# Patient Record
Sex: FEMALE | Race: WHITE | Employment: OTHER | URBAN - METROPOLITAN AREA
[De-identification: names, ages, dates, MRNs, and addresses within clinical notes are randomized per-mention and may not be internally consistent; named-entity substitution may affect disease eponyms.]

---

## 2022-06-20 ENCOUNTER — HOSPITAL ENCOUNTER (INPATIENT)
Age: 72
LOS: 2 days | Discharge: HOME | DRG: 202 | End: 2022-06-22
Attending: EMERGENCY MEDICINE | Admitting: INTERNAL MEDICINE
Payer: MEDICARE

## 2022-06-20 ENCOUNTER — APPOINTMENT (OUTPATIENT)
Dept: GENERAL RADIOLOGY | Age: 72
DRG: 202 | End: 2022-06-20
Attending: EMERGENCY MEDICINE
Payer: MEDICARE

## 2022-06-20 DIAGNOSIS — J44.1 ACUTE EXACERBATION OF CHRONIC OBSTRUCTIVE PULMONARY DISEASE (COPD) (HCC): Primary | ICD-10-CM

## 2022-06-20 DIAGNOSIS — R09.02 HYPOXIA: ICD-10-CM

## 2022-06-20 PROBLEM — E87.1 HYPONATREMIA: Status: ACTIVE | Noted: 2022-06-20

## 2022-06-20 PROBLEM — E86.0 DEHYDRATION: Status: ACTIVE | Noted: 2022-06-20

## 2022-06-20 PROBLEM — R63.6 UNDERWEIGHT: Status: ACTIVE | Noted: 2022-06-20

## 2022-06-20 PROBLEM — J96.01 ACUTE HYPOXEMIC RESPIRATORY FAILURE (HCC): Status: ACTIVE | Noted: 2022-06-20

## 2022-06-20 LAB
ALBUMIN SERPL-MCNC: 3.5 G/DL (ref 3.5–5)
ALBUMIN/GLOB SERPL: 0.9 {RATIO} (ref 1.1–2.2)
ALP SERPL-CCNC: 69 U/L (ref 45–117)
ALT SERPL-CCNC: 33 U/L (ref 12–78)
AMORPH CRY URNS QL MICRO: ABNORMAL
ANION GAP SERPL CALC-SCNC: 3 MMOL/L (ref 5–15)
APPEARANCE UR: CLEAR
AST SERPL-CCNC: 24 U/L (ref 15–37)
B PERT DNA SPEC QL NAA+PROBE: NOT DETECTED
BACTERIA URNS QL MICRO: NEGATIVE /HPF
BASOPHILS # BLD: 0 K/UL (ref 0–0.1)
BASOPHILS NFR BLD: 0 % (ref 0–1)
BILIRUB SERPL-MCNC: 0.4 MG/DL (ref 0.2–1)
BILIRUB UR QL: NEGATIVE
BNP SERPL-MCNC: 352 PG/ML (ref 0–125)
BORDETELLA PARAPERTUSSIS PCR, BORPAR: NOT DETECTED
BUN SERPL-MCNC: 11 MG/DL (ref 6–20)
BUN/CREAT SERPL: 18 (ref 12–20)
C PNEUM DNA SPEC QL NAA+PROBE: NOT DETECTED
CALCIUM SERPL-MCNC: 9.6 MG/DL (ref 8.5–10.1)
CHLORIDE SERPL-SCNC: 95 MMOL/L (ref 97–108)
CO2 SERPL-SCNC: 35 MMOL/L (ref 21–32)
COLOR UR: ABNORMAL
CREAT SERPL-MCNC: 0.61 MG/DL (ref 0.55–1.02)
DIFFERENTIAL METHOD BLD: ABNORMAL
EOSINOPHIL # BLD: 0 K/UL (ref 0–0.4)
EOSINOPHIL NFR BLD: 0 % (ref 0–7)
EPITH CASTS URNS QL MICRO: ABNORMAL /LPF
ERYTHROCYTE [DISTWIDTH] IN BLOOD BY AUTOMATED COUNT: 12.7 % (ref 11.5–14.5)
FLUAV H1 2009 PAND RNA SPEC QL NAA+PROBE: NOT DETECTED
FLUAV H1 RNA SPEC QL NAA+PROBE: NOT DETECTED
FLUAV H3 RNA SPEC QL NAA+PROBE: NOT DETECTED
FLUAV SUBTYP SPEC NAA+PROBE: NOT DETECTED
FLUBV RNA SPEC QL NAA+PROBE: NOT DETECTED
GLOBULIN SER CALC-MCNC: 4 G/DL (ref 2–4)
GLUCOSE SERPL-MCNC: 115 MG/DL (ref 65–100)
GLUCOSE UR STRIP.AUTO-MCNC: NEGATIVE MG/DL
HADV DNA SPEC QL NAA+PROBE: NOT DETECTED
HCOV 229E RNA SPEC QL NAA+PROBE: NOT DETECTED
HCOV HKU1 RNA SPEC QL NAA+PROBE: NOT DETECTED
HCOV NL63 RNA SPEC QL NAA+PROBE: NOT DETECTED
HCOV OC43 RNA SPEC QL NAA+PROBE: NOT DETECTED
HCT VFR BLD AUTO: 50.8 % (ref 35–47)
HGB BLD-MCNC: 16.1 G/DL (ref 11.5–16)
HGB UR QL STRIP: ABNORMAL
HMPV RNA SPEC QL NAA+PROBE: NOT DETECTED
HPIV1 RNA SPEC QL NAA+PROBE: NOT DETECTED
HPIV2 RNA SPEC QL NAA+PROBE: NOT DETECTED
HPIV3 RNA SPEC QL NAA+PROBE: DETECTED
HPIV4 RNA SPEC QL NAA+PROBE: NOT DETECTED
IMM GRANULOCYTES # BLD AUTO: 0 K/UL (ref 0–0.04)
IMM GRANULOCYTES NFR BLD AUTO: 0 % (ref 0–0.5)
KETONES UR QL STRIP.AUTO: NEGATIVE MG/DL
LEUKOCYTE ESTERASE UR QL STRIP.AUTO: NEGATIVE
LYMPHOCYTES # BLD: 2.1 K/UL (ref 0.8–3.5)
LYMPHOCYTES NFR BLD: 23 % (ref 12–49)
M PNEUMO DNA SPEC QL NAA+PROBE: NOT DETECTED
MCH RBC QN AUTO: 30.7 PG (ref 26–34)
MCHC RBC AUTO-ENTMCNC: 31.7 G/DL (ref 30–36.5)
MCV RBC AUTO: 96.9 FL (ref 80–99)
MONOCYTES # BLD: 0.8 K/UL (ref 0–1)
MONOCYTES NFR BLD: 9 % (ref 5–13)
NEUTS SEG # BLD: 6 K/UL (ref 1.8–8)
NEUTS SEG NFR BLD: 68 % (ref 32–75)
NITRITE UR QL STRIP.AUTO: NEGATIVE
NRBC # BLD: 0 K/UL (ref 0–0.01)
NRBC BLD-RTO: 0 PER 100 WBC
PH UR STRIP: 6.5 [PH] (ref 5–8)
PLATELET # BLD AUTO: 217 K/UL (ref 150–400)
PMV BLD AUTO: 10.2 FL (ref 8.9–12.9)
POTASSIUM SERPL-SCNC: 4.3 MMOL/L (ref 3.5–5.1)
PROCALCITONIN SERPL-MCNC: <0.05 NG/ML
PROT SERPL-MCNC: 7.5 G/DL (ref 6.4–8.2)
PROT UR STRIP-MCNC: NEGATIVE MG/DL
RBC # BLD AUTO: 5.24 M/UL (ref 3.8–5.2)
RBC #/AREA URNS HPF: ABNORMAL /HPF (ref 0–5)
RSV RNA SPEC QL NAA+PROBE: NOT DETECTED
RV+EV RNA SPEC QL NAA+PROBE: NOT DETECTED
SARS-COV-2 PCR, COVPCR: NOT DETECTED
SODIUM SERPL-SCNC: 133 MMOL/L (ref 136–145)
SP GR UR REFRACTOMETRY: 1.01 (ref 1–1.03)
TROPONIN-HIGH SENSITIVITY: 10 NG/L (ref 0–51)
UROBILINOGEN UR QL STRIP.AUTO: 0.2 EU/DL (ref 0.2–1)
WBC # BLD AUTO: 8.9 K/UL (ref 3.6–11)
WBC URNS QL MICRO: ABNORMAL /HPF (ref 0–4)

## 2022-06-20 PROCEDURE — 87086 URINE CULTURE/COLONY COUNT: CPT

## 2022-06-20 PROCEDURE — 84145 PROCALCITONIN (PCT): CPT

## 2022-06-20 PROCEDURE — 36415 COLL VENOUS BLD VENIPUNCTURE: CPT

## 2022-06-20 PROCEDURE — 93005 ELECTROCARDIOGRAM TRACING: CPT

## 2022-06-20 PROCEDURE — 85025 COMPLETE CBC W/AUTO DIFF WBC: CPT

## 2022-06-20 PROCEDURE — 74011250636 HC RX REV CODE- 250/636: Performed by: EMERGENCY MEDICINE

## 2022-06-20 PROCEDURE — 74011250636 HC RX REV CODE- 250/636: Performed by: INTERNAL MEDICINE

## 2022-06-20 PROCEDURE — 94640 AIRWAY INHALATION TREATMENT: CPT

## 2022-06-20 PROCEDURE — 84484 ASSAY OF TROPONIN QUANT: CPT

## 2022-06-20 PROCEDURE — 0202U NFCT DS 22 TRGT SARS-COV-2: CPT

## 2022-06-20 PROCEDURE — 83880 ASSAY OF NATRIURETIC PEPTIDE: CPT

## 2022-06-20 PROCEDURE — 74011000250 HC RX REV CODE- 250: Performed by: INTERNAL MEDICINE

## 2022-06-20 PROCEDURE — 74011250637 HC RX REV CODE- 250/637: Performed by: EMERGENCY MEDICINE

## 2022-06-20 PROCEDURE — 74011000250 HC RX REV CODE- 250: Performed by: EMERGENCY MEDICINE

## 2022-06-20 PROCEDURE — 99285 EMERGENCY DEPT VISIT HI MDM: CPT

## 2022-06-20 PROCEDURE — 80053 COMPREHEN METABOLIC PANEL: CPT

## 2022-06-20 PROCEDURE — 96374 THER/PROPH/DIAG INJ IV PUSH: CPT

## 2022-06-20 PROCEDURE — 65270000029 HC RM PRIVATE

## 2022-06-20 PROCEDURE — 71045 X-RAY EXAM CHEST 1 VIEW: CPT

## 2022-06-20 PROCEDURE — 81001 URINALYSIS AUTO W/SCOPE: CPT

## 2022-06-20 RX ORDER — ONDANSETRON 2 MG/ML
4 INJECTION INTRAMUSCULAR; INTRAVENOUS
Status: DISCONTINUED | OUTPATIENT
Start: 2022-06-20 | End: 2022-06-22 | Stop reason: HOSPADM

## 2022-06-20 RX ORDER — POLYETHYLENE GLYCOL 3350 17 G/17G
17 POWDER, FOR SOLUTION ORAL DAILY PRN
Status: DISCONTINUED | OUTPATIENT
Start: 2022-06-20 | End: 2022-06-22 | Stop reason: HOSPADM

## 2022-06-20 RX ORDER — ENOXAPARIN SODIUM 100 MG/ML
30 INJECTION SUBCUTANEOUS DAILY
Status: DISCONTINUED | OUTPATIENT
Start: 2022-06-21 | End: 2022-06-22 | Stop reason: HOSPADM

## 2022-06-20 RX ORDER — BUDESONIDE 0.5 MG/2ML
500 INHALANT ORAL
Status: DISCONTINUED | OUTPATIENT
Start: 2022-06-20 | End: 2022-06-22 | Stop reason: HOSPADM

## 2022-06-20 RX ORDER — ACETAMINOPHEN 650 MG/1
650 SUPPOSITORY RECTAL
Status: DISCONTINUED | OUTPATIENT
Start: 2022-06-20 | End: 2022-06-21

## 2022-06-20 RX ORDER — AZITHROMYCIN 250 MG/1
500 TABLET, FILM COATED ORAL
Status: COMPLETED | OUTPATIENT
Start: 2022-06-20 | End: 2022-06-20

## 2022-06-20 RX ORDER — ACETAMINOPHEN 325 MG/1
650 TABLET ORAL
Status: DISCONTINUED | OUTPATIENT
Start: 2022-06-20 | End: 2022-06-22 | Stop reason: HOSPADM

## 2022-06-20 RX ORDER — SODIUM CHLORIDE 0.9 % (FLUSH) 0.9 %
5-40 SYRINGE (ML) INJECTION EVERY 8 HOURS
Status: DISCONTINUED | OUTPATIENT
Start: 2022-06-20 | End: 2022-06-22 | Stop reason: HOSPADM

## 2022-06-20 RX ORDER — ENOXAPARIN SODIUM 100 MG/ML
40 INJECTION SUBCUTANEOUS DAILY
Status: DISCONTINUED | OUTPATIENT
Start: 2022-06-21 | End: 2022-06-20

## 2022-06-20 RX ORDER — IPRATROPIUM BROMIDE AND ALBUTEROL SULFATE 2.5; .5 MG/3ML; MG/3ML
3 SOLUTION RESPIRATORY (INHALATION)
Status: DISCONTINUED | OUTPATIENT
Start: 2022-06-20 | End: 2022-06-22 | Stop reason: HOSPADM

## 2022-06-20 RX ORDER — ONDANSETRON 4 MG/1
4 TABLET, ORALLY DISINTEGRATING ORAL
Status: DISCONTINUED | OUTPATIENT
Start: 2022-06-20 | End: 2022-06-21

## 2022-06-20 RX ORDER — ARFORMOTEROL TARTRATE 15 UG/2ML
15 SOLUTION RESPIRATORY (INHALATION)
Status: DISCONTINUED | OUTPATIENT
Start: 2022-06-20 | End: 2022-06-22 | Stop reason: HOSPADM

## 2022-06-20 RX ORDER — SODIUM CHLORIDE 0.9 % (FLUSH) 0.9 %
5-40 SYRINGE (ML) INJECTION AS NEEDED
Status: DISCONTINUED | OUTPATIENT
Start: 2022-06-20 | End: 2022-06-22 | Stop reason: HOSPADM

## 2022-06-20 RX ADMIN — ALBUTEROL SULFATE 1 DOSE: 2.5 SOLUTION RESPIRATORY (INHALATION) at 14:41

## 2022-06-20 RX ADMIN — METHYLPREDNISOLONE SODIUM SUCCINATE 125 MG: 125 INJECTION, POWDER, FOR SOLUTION INTRAMUSCULAR; INTRAVENOUS at 14:18

## 2022-06-20 RX ADMIN — AZITHROMYCIN MONOHYDRATE 500 MG: 250 TABLET ORAL at 14:19

## 2022-06-20 RX ADMIN — IPRATROPIUM BROMIDE AND ALBUTEROL SULFATE 3 ML: .5; 3 SOLUTION RESPIRATORY (INHALATION) at 16:12

## 2022-06-20 RX ADMIN — SODIUM CHLORIDE, PRESERVATIVE FREE 10 ML: 5 INJECTION INTRAVENOUS at 21:56

## 2022-06-20 RX ADMIN — METHYLPREDNISOLONE SODIUM SUCCINATE 40 MG: 40 INJECTION, POWDER, FOR SOLUTION INTRAMUSCULAR; INTRAVENOUS at 21:55

## 2022-06-20 RX ADMIN — ALBUTEROL SULFATE 1 DOSE: 2.5 SOLUTION RESPIRATORY (INHALATION) at 14:22

## 2022-06-20 NOTE — PROGRESS NOTES
Patient still at [address] [facility], [place] shift change report given to Cameron (oncoming nurse) by Tony Herrera RN (offgoing nurse). Report included the following information SBAR, Kardex, MAR and Recent Results.

## 2022-06-20 NOTE — ED TRIAGE NOTES
Seen Tuesday by PCP and changed inhaler and given a script for a steroid; pt reports no improvement; dyspneic on exertion; denied CP, n/v/d, fever; +cough productive at times clear in color; pts sats after amubulating to room sat 82% placed on 2 liters up to 97% pt is alert and oriented x 4;

## 2022-06-20 NOTE — ED PROVIDER NOTES
HPI     Please note that this dictation was completed with [employer], the computer voice recognition software. Quite often unanticipated grammatical, syntax, homophones, and other interpretive errors are inadvertently transcribed by the computer software. Please disregard these errors. Please excuse any errors that have escaped final proofreading. 79-year-old female with a history of COPD, chronic kidney disease here with increasing shortness of breath about 5 days ago. She called her doctor on Thursday who prescribed Medrol Dosepak. She was not any better 3 days ago on Friday so she was seen in the office. She was prescribed a steroid inhaler as well as albuterol. She has been taking those medications without relief. Cough is productive of yellow sputum. Denies any fevers. She is not on home oxygen. Denies any nasal congestion, chest discomfort or other complaints. No known sick contacts. Social history: Vaccinated for Sulema. Quit smoking 5 days ago when symptoms began. Past Medical History:   Diagnosis Date    Asthma     Chronic kidney disease        History reviewed. No pertinent surgical history. History reviewed. No pertinent family history.     Social History     Socioeconomic History    Marital status:      Spouse name: Not on file    Number of children: Not on file    Years of education: Not on file    Highest education level: Not on file   Occupational History    Not on file   Tobacco Use    Smoking status: Current Every Day Smoker     Packs/day: 0.25    Smokeless tobacco: Never Used   Substance and Sexual Activity    Alcohol use: Never    Drug use: Never    Sexual activity: Not on file   Other Topics Concern    Not on file   Social History Narrative    Not on file     Social Determinants of Health     Financial Resource Strain:     Difficulty of Paying Living Expenses: Not on file   Food Insecurity:     Worried About Running Out of Food in the Last Year: Not on file    [address] in the Last Year: Not on file   Transportation Needs:     Lack of Transportation (Medical): Not on file    Lack of Transportation (Non-Medical): Not on file   Physical Activity:     Days of Exercise per Week: Not on file    Minutes of Exercise per Session: Not on file   Stress:     Feeling of Stress : Not on file   Social Connections:     Frequency of Communication with Friends and Family: Not on file    Frequency of Social Gatherings with Friends and Family: Not on file    Attends Holiness Services: Not on file    Active Member of [address] [place] [address] [employer] or Organizations: Not on file    Attends Club or Organization Meetings: Not on file    Marital Status: Not on file   Intimate Partner Violence:     Fear of Current or Ex-Partner: Not on file    Emotionally Abused: Not on file    Physically Abused: Not on file    Sexually Abused: Not on file   Housing Stability:     Unable to Pay for Housing in the Last Year: Not on file    Number of [place] in the Last Year: Not on file    Unstable Housing in the Last Year: Not on file         ALLERGIES: Patient has no known allergies. Review of Systems   Constitutional: Negative for chills and fever. HENT: Negative for congestion and rhinorrhea. Respiratory: Positive for shortness of breath and wheezing. Negative for chest tightness. Cardiovascular: Negative for chest pain. Gastrointestinal: Negative for diarrhea, nausea and vomiting. Skin: Negative for rash. All other systems reviewed and are negative. Vitals:    06/20/22 1308 06/20/22 1311 06/20/22 1320 06/20/22 1335   BP: (!) 178/80  (!) 144/75 (!) 160/73   Pulse:  72     Resp:  20     Temp:  98 °F (36.7 °C)     SpO2:  (!) 88% (!) 87% 98%   Weight:       Height:                Physical Exam  Vitals and nursing note reviewed. Constitutional:       Appearance: She is well-developed. She is not toxic-appearing. HENT:      Head: Normocephalic and atraumatic.       Nose: No congestion or rhinorrhea. Mouth/Throat:      Mouth: Mucous membranes are moist.      Pharynx: No oropharyngeal exudate. Eyes:      General: No scleral icterus. Right eye: No discharge. Left eye: No discharge. Conjunctiva/sclera: Conjunctivae normal.      Pupils: Pupils are equal, round, and reactive to light. Cardiovascular:      Rate and Rhythm: Normal rate and regular rhythm. Heart sounds: Normal heart sounds. No murmur heard. No friction rub. No gallop. Pulmonary:      Breath sounds: Wheezing present. No rhonchi or rales. Comments: Mild pursed lip breathing  Abdominal:      General: Bowel sounds are normal. There is no distension. Palpations: Abdomen is soft. Tenderness: There is no abdominal tenderness. There is no guarding. Musculoskeletal:         General: No tenderness. Normal range of motion. Cervical back: Normal range of motion and neck supple. Lymphadenopathy:      Cervical: No cervical adenopathy. Skin:     General: Skin is warm and dry. Coloration: Skin is not pale. Findings: No rash. Neurological:      Mental Status: She is alert and oriented to person, place, and time. Cranial Nerves: No cranial nerve deficit. Coordination: Coordination normal.          MDM     69-year-old female here with hypoxia, productive cough. History of COPD. She is wheezing on exam.  Will give duo nebs and IV steroids and antibiotics. Chest x-ray without infiltrate. Afebrile. Procedures      ED EKG interpretation:  Rhythm: normal sinus rhythm; and regular . Rate (approx.): 66; Axis: normal; P wave: normal; QRS interval: normal ; ST/T wave: normal; . This EKG was interpreted by Beck Gonzales MD,ED Provider. 3:31 PM  Improved aeration after nebs. Pt still with some mild wheezing in upper lung fields. Will admit. Pt failing outpatient therapy. Hypoxia.       Perfect Serve Consult for Admission  3:31 PM    ED Room Number: WER12/12  Patient Name and age:  Jerri March 66 y.o.  female  Working Diagnosis:   1. Acute exacerbation of chronic obstructive pulmonary disease (COPD) ([address]    2. Hypoxia        COVID-19 Suspicion:  Other covid test pending  Sepsis present:  no  Reassessment needed: no  Code Status:  Full Code  Readmission: no  Isolation Requirements:  Other covid test pending  Recommended Level of Care:  telemetry  Department:[facility] - [phone]  Other: Patient already on inhalers as well as steroids as outpatient. Gave IV steroids, azithromycin here as well as back-to-back duo nebs. 87% on room air. On nasal cannula. COVID test pending. Recent Results (from the past 24 hour(s))   CBC WITH AUTOMATED DIFF    Collection Time: 06/20/22  1:09 PM   Result Value Ref Range    WBC 8.9 3.6 - 11.0 K/uL    RBC 5.24 (H) 3.80 - 5.20 M/uL    HGB 16.1 (H) 11.5 - 16.0 g/dL    HCT 50.8 (H) 35.0 - 47.0 %    MCV 96.9 80.0 - 99.0 FL    MCH 30.7 26.0 - 34.0 PG    MCHC 31.7 30.0 - 36.5 g/dL    RDW 12.7 11.5 - 14.5 %    PLATELET 925 968 - 537 K/uL    MPV 10.2 8.9 - 12.9 FL    NRBC 0.0 0.0  WBC    ABSOLUTE NRBC 0.00 0.00 - 0.01 K/uL    NEUTROPHILS 68 32 - 75 %    LYMPHOCYTES 23 12 - 49 %    MONOCYTES 9 5 - 13 %    EOSINOPHILS 0 0 - 7 %    BASOPHILS 0 0 - 1 %    IMMATURE GRANULOCYTES 0 0 - 0.5 %    ABS. NEUTROPHILS 6.0 1.8 - 8.0 K/UL    ABS. LYMPHOCYTES 2.1 0.8 - 3.5 K/UL    ABS. MONOCYTES 0.8 0.0 - 1.0 K/UL    ABS. EOSINOPHILS 0.0 0.0 - 0.4 K/UL    ABS. BASOPHILS 0.0 0.0 - 0.1 K/UL    ABS. IMM.  GRANS. 0.0 0.00 - 0.04 K/UL    DF AUTOMATED     METABOLIC PANEL, COMPREHENSIVE    Collection Time: 06/20/22  1:09 PM   Result Value Ref Range    Sodium 133 (L) 136 - 145 mmol/L    Potassium 4.3 3.5 - 5.1 mmol/L    Chloride 95 (L) 97 - 108 mmol/L    CO2 35 (H) 21 - 32 mmol/L    Anion gap 3 (L) 5 - 15 mmol/L    Glucose 115 (H) 65 - 100 mg/dL    BUN 11 6 - 20 MG/DL    Creatinine 0.61 0.55 - 1.02 MG/DL    BUN/Creatinine ratio 18 12 - 20      GFR est AA >60 >60 ml/min/1.73m2    GFR est non-AA >60 >60 ml/min/1.73m2    Calcium 9.6 8.5 - 10.1 MG/DL    Bilirubin, total 0.4 0.2 - 1.0 MG/DL    ALT (SGPT) 33 12 - 78 U/L    AST (SGOT) 24 15 - 37 U/L    Alk. phosphatase 69 45 - 117 U/L    Protein, total 7.5 6.4 - 8.2 g/dL    Albumin 3.5 3.5 - 5.0 g/dL    Globulin 4.0 2.0 - 4.0 g/dL    A-G Ratio 0.9 (L) 1.1 - 2.2     TROPONIN-HIGH SENSITIVITY    Collection Time: 06/20/22  1:09 PM   Result Value Ref Range    Troponin-High Sensitivity 10 0 - 51 ng/L   NT-PRO BNP    Collection Time: 06/20/22  1:09 PM   Result Value Ref Range    NT pro- (H) 0 - 125 PG/ML       XR CHEST PORT    Result Date: 6/20/2022  EXAM:  CR chest portable INDICATION: Shortness of breath COMPARISON: None. TECHNIQUE: Portable AP upright chest view at 1302 hours FINDINGS: The cardiomediastinal contours are within normal limits. The lungs and pleural spaces are clear. There is no pneumothorax. The bones and upper abdomen are unremarkable. No acute process.

## 2022-06-20 NOTE — H&P
[address]  [phone]    Hospitalist Admission Note      NAME:  Keyana Banuelos   :   1950   MRN:  [id number]     PCP:  Sujit Watson MD     Date/Time of service:  2022 4:21 PM          Subjective:     CHIEF COMPLAINT: dypsnea    HISTORY OF PRESENT ILLNESS:     Ms. Venkatesh Griggs is a 66 y.o.  female who presented to the Emergency Department complaining of dyspnea. Worsening over days. Not relieved with PO steroids. No fever or known sick contacts. ER finds wheezing and mild hypoxia. We will admit her for management. Past Medical History:   Diagnosis Date    Asthma     Chronic kidney disease         History reviewed. No pertinent surgical history. Social History     Tobacco Use    Smoking status: Current Every Day Smoker     Packs/day: 0.25    Smokeless tobacco: Never Used   Substance Use Topics    Alcohol use: Never        History reviewed. No pertinent family history. Family hx cannot be fully assessed, since the patient cannot provide information    No Known Allergies     Prior to Admission medications    Not on File       Review of Systems:  (bold if positive, if negative)    Gen:  Eyes:  ENT:  CVS:  Pulm:  Cough, dyspnea, , wheezingGI:  :  MS:  Skin:  Psych:  Endo:  Hem:  Renal:  Neuro:        Objective:      VITALS:    Vital signs reviewed; most recent are:    Visit Vitals  BP (!) 144/72   Pulse 85   Temp 98 °F (36.7 °C)   Resp 26   Ht 5' 2\" (1.575 m)   Wt 49.9 kg (110 lb)   SpO2 98%   BMI 20.12 kg/m²     SpO2 Readings from Last 6 Encounters:   22 98%        No intake or output data in the 24 hours ending 22 1621     Exam:     Physical Exam:    Gen:  Thin, in no acute distress  HEENT:  Pink conjunctivae, PERRL, hearing intact to voice, moist mucous membranes  Neck:  Supple, without masses, thyroid non-tender  Resp:  No accessory muscle use, clear breath sounds with wheezes   Card:   No murmurs, normal S1, S2 without thrills, bruits or peripheral edema  Abd:  Soft, non-tender, non-distended, normoactive bowel sounds are present, no mass  Lymph:  No cervical or inguinal adenopathy  Musc:  No cyanosis or clubbing  Skin:  No rashes or ulcers, skin turgor is good  Neuro:  Cranial nerves are grossly intact, mild motor weakness, follows commands appropriately  Psych:  Good insight, oriented to person, place and time, alert     Labs:    Recent Labs     06/20/22  1309   WBC 8.9   HGB 16.1*   HCT 50.8*        Recent Labs     06/20/22  1309   *   K 4.3   CL 95*   CO2 35*   *   BUN 11   CREA 0.61   CA 9.6   ALB 3.5   TBILI 0.4   ALT 33     No results found for: GLUCPOC  No results for input(s): PH, PCO2, PO2, HCO3, FIO2 in the last 72 hours. No results for input(s): INR, INREXT in the last 72 hours. All Micro Results     Procedure Component Value Units Date/Time    RESPIRATORY VIRUS PANEL W/COVID-19, PCR [[id number]]     Order Status: Sent Specimen: NASOPHARYNGEAL SWAB     COVID-19 RAPID TEST [[id number]]     Order Status: Sent           I have reviewed previous records       Assessment and Plan:      COPD exacerbation - POA, unclear trigger. Start solumedrol, brovana, pulmicort, prn duonebs. Xray clear, no sepsis criteria, so no antibiotics    Acute hypoxemic respiratory failure - POA, presumed due to COPD, but check procalcitonin, DDimer, UA, RVP. Oxygen as needed. May need CT chest and Pulmonary consult is not improving quickly. Dehydration / Hyponatremia - POA, mild. Hydrate and follow    Underweight - Supplements. Telemetry reviewed:   normal sinus rhythm    Risk of deterioration: high      Total time spent with patient: 48 Minutes I personally reviewed chart, notes, data and current medications in the medical record. I have personally examined and treated the patient at bedside during this period.  To assist coordination of care and communication with nursing and staff, this note may be preliminary early in the day, but finalized by end of the day.                  Care Plan discussed with: Patient, Nursing Staff and >50% of time spent in counseling and coordination of care    Discussed:  Care Plan       ___________________________________________________    Attending Physician: Willam Ontiveros MD

## 2022-06-20 NOTE — PROGRESS NOTES
[facility] Dosing Services: 6/20/2022    Pharmacist Renal Dosing Progress Note for Lovenox   Physician Dr. Jessy Nicholas    The following medication: enoxaparin was automatically dose-adjusted per [facility] P&T Committee Protocol, with respect to renal function. Consult provided for this   66 y.o. , female , for the indication of DVT/PE prophylaxis. Pt Weight:   Wt Readings from Last 1 Encounters:   06/20/22 49.9 kg (110 lb)     Dosage changed to:  Enoxaparin 30 mg every 24 hours for weight of 50 kg    Additional notes: CrCl 57 ml/min    Previous Regimen Enoxaparin 40 mg every 24 hours   Serum Creatinine Lab Results   Component Value Date/Time    Creatinine 0.61 06/20/2022 01:09 PM       Creatinine Clearance Estimated Creatinine Clearance: 57.2 mL/min (by C-G formula based on SCr of 0.61 mg/dL). BUN Lab Results   Component Value Date/Time    BUN 11 06/20/2022 01:09 PM           Pharmacy to continue to monitor patient daily. Will make dosage adjustments based upon changing renal function.   [address], PHARMD. Contact information:  [phone]

## 2022-06-21 PROBLEM — J20.4 PARAINFLUENZA VIRUS BRONCHITIS: Status: ACTIVE | Noted: 2022-06-21

## 2022-06-21 LAB
ALBUMIN SERPL-MCNC: 2.8 G/DL (ref 3.5–5)
ALBUMIN/GLOB SERPL: 0.7 {RATIO} (ref 1.1–2.2)
ALP SERPL-CCNC: 62 U/L (ref 45–117)
ALT SERPL-CCNC: 27 U/L (ref 12–78)
ANION GAP SERPL CALC-SCNC: 4 MMOL/L (ref 5–15)
AST SERPL-CCNC: 19 U/L (ref 15–37)
BACTERIA SPEC CULT: NORMAL
BILIRUB SERPL-MCNC: 0.3 MG/DL (ref 0.2–1)
BUN SERPL-MCNC: 13 MG/DL (ref 6–20)
BUN/CREAT SERPL: 22 (ref 12–20)
CALCIUM SERPL-MCNC: 9 MG/DL (ref 8.5–10.1)
CHLORIDE SERPL-SCNC: 101 MMOL/L (ref 97–108)
CO2 SERPL-SCNC: 30 MMOL/L (ref 21–32)
CREAT SERPL-MCNC: 0.59 MG/DL (ref 0.55–1.02)
D DIMER PPP FEU-MCNC: 0.55 MG/L FEU (ref 0–0.65)
ERYTHROCYTE [DISTWIDTH] IN BLOOD BY AUTOMATED COUNT: 12.6 % (ref 11.5–14.5)
GLOBULIN SER CALC-MCNC: 3.9 G/DL (ref 2–4)
GLUCOSE SERPL-MCNC: 173 MG/DL (ref 65–100)
HCT VFR BLD AUTO: 46.6 % (ref 35–47)
HGB BLD-MCNC: 15 G/DL (ref 11.5–16)
MAGNESIUM SERPL-MCNC: 2.5 MG/DL (ref 1.6–2.4)
MCH RBC QN AUTO: 31.3 PG (ref 26–34)
MCHC RBC AUTO-ENTMCNC: 32.2 G/DL (ref 30–36.5)
MCV RBC AUTO: 97.3 FL (ref 80–99)
NRBC # BLD: 0 K/UL (ref 0–0.01)
NRBC BLD-RTO: 0 PER 100 WBC
PLATELET # BLD AUTO: 225 K/UL (ref 150–400)
PMV BLD AUTO: 10.8 FL (ref 8.9–12.9)
POTASSIUM SERPL-SCNC: 4.6 MMOL/L (ref 3.5–5.1)
PROT SERPL-MCNC: 6.7 G/DL (ref 6.4–8.2)
RBC # BLD AUTO: 4.79 M/UL (ref 3.8–5.2)
SERVICE CMNT-IMP: NORMAL
SODIUM SERPL-SCNC: 135 MMOL/L (ref 136–145)
WBC # BLD AUTO: 6.7 K/UL (ref 3.6–11)

## 2022-06-21 PROCEDURE — 77010033678 HC OXYGEN DAILY

## 2022-06-21 PROCEDURE — 94761 N-INVAS EAR/PLS OXIMETRY MLT: CPT

## 2022-06-21 PROCEDURE — 74011250636 HC RX REV CODE- 250/636: Performed by: INTERNAL MEDICINE

## 2022-06-21 PROCEDURE — 97116 GAIT TRAINING THERAPY: CPT

## 2022-06-21 PROCEDURE — 97165 OT EVAL LOW COMPLEX 30 MIN: CPT

## 2022-06-21 PROCEDURE — 94640 AIRWAY INHALATION TREATMENT: CPT

## 2022-06-21 PROCEDURE — 65270000029 HC RM PRIVATE

## 2022-06-21 PROCEDURE — 85027 COMPLETE CBC AUTOMATED: CPT

## 2022-06-21 PROCEDURE — 74011000250 HC RX REV CODE- 250: Performed by: INTERNAL MEDICINE

## 2022-06-21 PROCEDURE — 83735 ASSAY OF MAGNESIUM: CPT

## 2022-06-21 PROCEDURE — 97530 THERAPEUTIC ACTIVITIES: CPT

## 2022-06-21 PROCEDURE — 85379 FIBRIN DEGRADATION QUANT: CPT

## 2022-06-21 PROCEDURE — 97161 PT EVAL LOW COMPLEX 20 MIN: CPT

## 2022-06-21 PROCEDURE — 2709999900 HC NON-CHARGEABLE SUPPLY

## 2022-06-21 PROCEDURE — 36415 COLL VENOUS BLD VENIPUNCTURE: CPT

## 2022-06-21 PROCEDURE — 94664 DEMO&/EVAL PT USE INHALER: CPT

## 2022-06-21 PROCEDURE — 80053 COMPREHEN METABOLIC PANEL: CPT

## 2022-06-21 RX ADMIN — BUDESONIDE 500 MCG: 0.5 SUSPENSION RESPIRATORY (INHALATION) at 07:40

## 2022-06-21 RX ADMIN — METHYLPREDNISOLONE SODIUM SUCCINATE 40 MG: 40 INJECTION, POWDER, FOR SOLUTION INTRAMUSCULAR; INTRAVENOUS at 09:15

## 2022-06-21 RX ADMIN — SODIUM CHLORIDE, PRESERVATIVE FREE 10 ML: 5 INJECTION INTRAVENOUS at 21:46

## 2022-06-21 RX ADMIN — ENOXAPARIN SODIUM 30 MG: 100 INJECTION SUBCUTANEOUS at 09:15

## 2022-06-21 RX ADMIN — ARFORMOTEROL TARTRATE 15 MCG: 15 SOLUTION RESPIRATORY (INHALATION) at 20:33

## 2022-06-21 RX ADMIN — ARFORMOTEROL TARTRATE 15 MCG: 15 SOLUTION RESPIRATORY (INHALATION) at 07:40

## 2022-06-21 RX ADMIN — METHYLPREDNISOLONE SODIUM SUCCINATE 40 MG: 40 INJECTION, POWDER, FOR SOLUTION INTRAMUSCULAR; INTRAVENOUS at 21:44

## 2022-06-21 RX ADMIN — BUDESONIDE 500 MCG: 0.5 SUSPENSION RESPIRATORY (INHALATION) at 20:33

## 2022-06-21 RX ADMIN — SODIUM CHLORIDE, PRESERVATIVE FREE 10 ML: 5 INJECTION INTRAVENOUS at 05:02

## 2022-06-21 NOTE — PROGRESS NOTES
Problem: Mobility Impaired (Adult and Pediatric)  Goal: *Acute Goals and Plan of Care (Insert Text)  Outcome: Progressing Towards Goal   PHYSICAL THERAPY EVALUATION/DISCHARGE  Patient: Beverley Lobo (72 y.o. female)  Date: 6/21/2022  Primary Diagnosis: COPD exacerbation ([facility] [J44.1]        Precautions:    Contact/Droplet      ASSESSMENT  Based on the objective data described below, the patient presents with admission due to COPD exacerbation/acute resp failure with SOB and O2 sats of 82%. Testing positive for Influenza with cough and yellow sputum. Pt received on 2LO2 with sats of 92%. She is extremely talkative and needing repetative verbal cueing to pursed lip breathe and focus on task at hand. Pt is independent with all mobility and not in need of PT services. Respiratory Therapy may follow if continued home O2 assessment is needed. At this point pt able to maintain 90% O2 sats on 1LO2 with walking. See below. Documentation for home O2:     ROOM AIR    AT REST   O2 SATS  88 87   ROOM AIR WITH ACTIVITY 02 SATS  85 HR  91   (1    ) LITERS OF O2 WITH ACTIVITY O2 SATS  90 HR  79   (1    )LITERS OF 02 PATIENT LEFT COMFORTABLY  SITTING/SUPINE 02 SATS  90 HR  76        Functional Outcome Measure: The patient scored 100/100 on the barthel outcome measure   Other factors to consider for discharge: home O2 needs     Further skilled acute physical therapy is not indicated at this time. PLAN :  Recommendation for discharge: (in order for the patient to meet his/her long term goals)  No skilled physical therapy/ follow up rehabilitation needs identified at this time. This discharge recommendation:  Has been made in collaboration with the attending provider and/or case management    IF patient discharges home will need the following DME: none       SUBJECTIVE:   Patient stated I am fine now.     OBJECTIVE DATA SUMMARY:   HISTORY:    Past Medical History:   Diagnosis Date    Asthma     Chronic kidney disease    History reviewed. No pertinent surgical history. Prior level of function: independent  Personal factors and/or comorbidities impacting plan of care: per above and below    Home Situation  Home Environment: Private residence  # Steps to Enter: 3  Rails to Enter: Yes  Hand Rails : Bilateral  One/Two Story Residence: One story  Living Alone: No  Support Systems: Spouse/Significant Other  Patient Expects to be Discharged to[de-identified] Home  Current DME Used/Available at Home: None  Tub or Shower Type: Tub/Shower combination    EXAMINATION/PRESENTATION/DECISION MAKING:   Critical Behavior:  Neurologic State: Alert  Orientation Level: Oriented X4  Cognition: Follows commands,Appropriate decision making,Appropriate for age attention/concentration,Appropriate safety awareness     Hearing: Auditory  Auditory Impairment: Hard of hearing, bilateral  Skin:  IV  Edema: ENL  Range Of Motion:  AROM: Within functional limits           PROM: Within functional limits           Strength:    Strength: Within functional limits                    Tone & Sensation:                                  Coordination:     Vision:      Functional Mobility:  Bed Mobility:  Rolling: Independent  Supine to Sit: Independent  Sit to Supine: Independent  Scooting: Independent  Transfers:  Sit to Stand: Independent  Stand to Sit: Independent        Bed to Chair: Independent              Balance:   Sitting: Intact  Standing: Intact  Ambulation/Gait Training:  Distance (ft): 150 Feet (ft)  Assistive Device: Gait belt                    Base of Support: Narrowed                                 Therapeutic Exercises:   Pursed lip breathing    Functional Measure:  Barthel Index:    Bathin  Bladder: 10  Bowels: 10  Groomin  Dressing: 10  Feeding: 10  Mobility: 15  Stairs: 10  Toilet Use: 10  Transfer (Bed to Chair and Back): 15  Total: 100/100       The Barthel ADL Index: Guidelines  1.  The index should be used as a record of what a patient does, not as a record of what a patient could do. 2. The main aim is to establish degree of independence from any help, physical or verbal, however minor and for whatever reason. 3. The need for supervision renders the patient not independent. 4. A patient's performance should be established using the best available evidence. Asking the patient, friends/relatives and nurses are the usual sources, but direct observation and common sense are also important. However direct testing is not needed. 5. Usually the patient's performance over the preceding 24-48 hours is important, but occasionally longer periods will be relevant. 6. Middle categories imply that the patient supplies over 50 per cent of the effort. 7. Use of aids to be independent is allowed. Score Interpretation (from [facility])    Independent   60-79 Minimally independent   40-59 Partially dependent   20-39 Very dependent   <20 Totally dependent     -Anais Blake., BarthelLUISW. (1965). Functional evaluation: the Barthel Index. [address] [facility] ([address] (1997). The Barthel activities of daily living index: self-reporting versus actual performance in the old (> or = 75 years). Journal of [address] [place] [address] 45(7), 14 [facility], J.CLAY, Harjeet Cardenas., Renetta Johnson. (1999). Measuring the change in disability after inpatient rehabilitation; comparison of the responsiveness of the Barthel Index and Functional [place] Measure. Journal of Neurology, Neurosurgery, and Psychiatry, 66(4), 268-833. Kanika Persaud, N.J.A, CRISTI Loyd, & Toshia Fernandez MAbbieA. (2004) Assessment of post-stroke quality of life in cost-effectiveness studies: The usefulness of the Barthel Index and the EuroQoL-5D.  Quality of Life Research, 15, 281-43           Physical Therapy Evaluation Charge Determination   History Examination Presentation Decision-Making   MEDIUM  Complexity : 1-2 comorbidities / personal factors will impact the outcome/ POC  MEDIUM Complexity : 3 Standardized tests and measures addressing body structure, function, activity limitation and / or participation in recreation  LOW Complexity : Evolving with changing characteristics  Other outcome measures barthel  LOW       Based on the above components, the patient evaluation is determined to be of the following complexity level: LOW     Pain Rating:  none    Activity Tolerance:   Good on 1LO2       After treatment patient left in no apparent distress:   Sitting in chair and Call bell within reach    COMMUNICATION/EDUCATION:   The patients plan of care was discussed with: Registered nurse and Case management. Fall prevention education was provided and the patient/caregiver indicated understanding., Patient/family have participated as able in goal setting and plan of care. , and Patient/family agree to work toward stated goals and plan of care.     Thank you for this referral.  Vannesa Pierce, PT   Time Calculation: 30 mins

## 2022-06-21 NOTE — PROGRESS NOTES
6/21/2022  3:59 PM    Reason for Admission:  Dyspnea  Assessment completed over the phone with patient    PMH: CKD; asthma    At baseline, patient is independent with ADLs and drives. She lives with her  in a home with 4 steps to enter, one-level. DME: None, no history of home oxygen  HH/rehab: no hx    Preferred Rx: [address]                 RUR Score:    7%, low                 Plan for utilizing home health:     No needs found on PT evaluation     PCP: First and Last name:  Kayla Marcum MD     Name of Practice:    Are you a current patient: Yes/No: Yes   Approximate date of last visit: 6/17/22   Can you participate in a virtual visit with your PCP: No                    Current Advanced Directive/Advance Care Plan: Full Code      Healthcare Decision Maker:   Rosalina Topete, spouse, [phone]                  Transition of Care Plan:                    1. Parainfluenza - steroids for support; duonebs  2. Home with family assistance - follow for oxygen needs (walk test completed today by PT @ 1200); no home health needs found by PT  3. Outpatient f/u  4. Family to transport  5. CM to continue to follow    Care Management Interventions  PCP Verified by CM: Yes Shruti Graham)  Palliative Care Criteria Met (RRAT>21 & CHF Dx)?: No  Mode of Transport at Discharge:  Other (see comment) (family)  Transition of Care Consult (CM Consult): Discharge Planning  MyChart Signup: No  Discharge Durable Medical Equipment: No  Physical Therapy Consult: Yes  Occupational Therapy Consult: Yes  Speech Therapy Consult: No  Support Systems: Spouse/Significant Other  Confirm Follow Up Transport: Family  The Plan for Transition of Care is Related to the Following Treatment Goals : dyspnea  Discharge Location  Patient Expects to be Discharged to[de-identified] Home with family assistance    Lázaro Gipson RN

## 2022-06-21 NOTE — PROGRESS NOTES
OCCUPATIONAL THERAPY EVALUATION/DISCHARGE  Patient: Sparkle Jain (72 y.o. female)  Date: 6/21/2022  Primary Diagnosis: COPD exacerbation ([facility] [J44.1]        Precautions:        ASSESSMENT  Pt received semi supine in bed A&Ox4 and agreeable for OT eval/tx. Per pt report, pt lives with spouse in one story home with 3 steps ana rails to enter and is independent for self care and functional transfers/mobility. Pt educated on energy conservation techniques with pt verbalizing understanding. Pt close to baseline for self care (independent LB dressing, independent toileting/cloth mgmt simulated, independent grooming standing sink simulated) and functional transfers/mobility (independent sup<->sit, scooting, sit<->stand and toilet transfer). Pt with no acute OT needs at this time thus will D/C from skilled services after eval. Recommend discharge to home independently when medically appropriate. Current Level of Function (ADLs/self-care): independent    Functional Outcome Measure: The patient scored 100 on the Barthel Index. Other factors to consider for discharge: at baseline functional status, good family support     PLAN :  Recommend with staff: allow toilet transfers    Recommendation for discharge: (in order for the patient to meet his/her long term goals)  No skilled occupational therapy/ follow up rehabilitation needs identified at this time. This discharge recommendation:  Has been made in collaboration with the attending provider and/or case management    IF patient discharges home will need the following DME: TBD        SUBJECTIVE:   Patient stated I am just bored in here.     OBJECTIVE DATA SUMMARY:   HISTORY:   Past Medical History:   Diagnosis Date    Asthma     Chronic kidney disease    History reviewed. No pertinent surgical history.     Prior Level of Function/Environment/Context: independent for self care and transfers  Expanded or extensive additional review of patient history:   Home Situation  Home Environment: Private residence  # Steps to Enter: 3  Rails to Enter: Yes  Hand Rails : Bilateral  One/Two Story Residence: One story  Living Alone: No  Support Systems: Spouse/Significant Other  Patient Expects to be Discharged to[de-identified] Home  Current DME Used/Available at Home: None  Tub or Shower Type: Tub/Shower combination    EXAMINATION OF PERFORMANCE DEFICITS:  Cognitive/Behavioral Status:  Neurologic State: Alert  Orientation Level: Oriented X4     Hearing: Auditory  Auditory Impairment: Hard of hearing, bilateral    Range of Motion:  AROM: Within functional limits  PROM: Within functional limits     Strength:  Strength: Within functional limits    Balance:  Sitting: Intact  Standing: Intact    Functional Mobility and Transfers for ADLs:  Bed Mobility:  Rolling: Independent  Supine to Sit: Independent  Sit to Supine: Independent  Scooting: Independent    Transfers:  Sit to Stand: Independent  Stand to Sit: Independent  Bed to Chair: Independent  Bathroom Mobility: Independent  Toilet Transfer : Independent    ADL Assessment:     Oral Facial Hygiene/Grooming: Independent    Lower Body Dressing: Independent    Toileting: Independent     ADL Intervention and task modifications:     Grooming  Grooming Assistance: Independent (simulated)  Position Performed: Standing    Lower Body Dressing Assistance  Socks: Independent  Shoes with Cloth Laces: Independent  Leg Crossed Method Used: Yes  Position Performed: Seated edge of bed    Toileting  Toileting Assistance: Independent  Bladder Hygiene: Independent  Bowel Hygiene: Independent  Clothing Management: Independent    Barthel Index:    Bathin  Bladder: 10  Bowels: 10  Groomin  Dressing: 10  Feeding: 10  Mobility: 15  Stairs: 10  Toilet Use: 10  Transfer (Bed to Chair and Back): 15  Total: 100/100        The Barthel ADL Index: Guidelines  1. The index should be used as a record of what a patient does, not as a record of what a patient could do.   2. The main aim is to establish degree of independence from any help, physical or verbal, however minor and for whatever reason. 3. The need for supervision renders the patient not independent. 4. A patient's performance should be established using the best available evidence. Asking the patient, friends/relatives and nurses are the usual sources, but direct observation and common sense are also important. However direct testing is not needed. 5. Usually the patient's performance over the preceding 24-48 hours is important, but occasionally longer periods will be relevant. 6. Middle categories imply that the patient supplies over 50 per cent of the effort. 7. Use of aids to be independent is allowed. Star Montgomery., Barthel, D.W. (1478). Functional evaluation: the Barthel Index. [facility] (14)2. Nestor Mckeon dheeraj HIMANSHU Pace, Carlito Tony., No Buchanan., Joon, [address] [facility] (1999). Measuring the change indisability after inpatient rehabilitation; comparison of the responsiveness of the Barthel Index and Functional Hemphill Measure. Journal of Neurology, Neurosurgery, and Psychiatry, 66(4), 256-528. Sanjay Ohara, N.J.A, CRISTI Loyd, & Sarah Thompson, M.A. (2004.) Assessment of post-stroke quality of life in cost-effectiveness studies: The usefulness of the Barthel Index and the EuroQoL-5D. Quality of Life Research, 15, [address] WERNER \"6 Clicks\"                                                       Daily Activity Inpatient Short Form  How much help from another person does the patient currently need. .. Total; A Lot A Little None   1. Putting on and taking off regular lower body clothing? []  1 []  2 []  3 [x]  4   2. Bathing (including washing, rinsing, drying)? []  1 []  2 []  3 [x]  4   3. Toileting, which includes using toilet, bedpan or urinal? [] 1 []  2 []  3 [x]  4   4. Putting on and taking off regular upper body clothing? []  1 []  2 []  3 [x]  4   5.   Taking care of personal grooming such as brushing teeth? []  1 []  2 []  3 [x]  4   6. Eating meals? []  1 []  2 []  3 [x]  4   © 2007, Trustees of [address] [place] [address] [address] [address], under license to [employer]. All rights reserved     Score: 24/24     Interpretation of Tool:  Represents clinically-significant functional categories (i.e. Activities of daily living). Percentage of Impairment CH    0%   CI    1-19% CJ    20-39% CK    40-59% CL    60-79% CM    80-99% CN     100%   [facility]  Score 6-24 24 23 20-22 15-19 10-14 7-9 6       Occupational Therapy Evaluation Charge Determination   History Examination Decision-Making   LOW Complexity : Brief history review  LOW Complexity : 1-3 performance deficits relating to physical, cognitive , or psychosocial skils that result in activity limitations and / or participation restrictions  LOW Complexity : No comorbidities that affect functional and no verbal or physical assistance needed to complete eval tasks       Based on the above components, the patient evaluation is determined to be of the following complexity level: LOW   Pain Rating:  No pain reported    Activity Tolerance:   Good  Please refer to the flowsheet for vital signs taken during this treatment. After treatment patient left in no apparent distress:    Supine in bed and Call bell within reach    COMMUNICATION/EDUCATION:   The patients plan of care was discussed with: Registered nurse.      Thank you for this referral.  Bhaskar Salamanca  Time Calculation: 29 mins

## 2022-06-21 NOTE — PROGRESS NOTES
[address]  [phone]    Medical Progress Note      NAME: Trey Garces   :  1950  MRM:  [id number]    Date/Time of service 2022  12:41 PM          Assessment and Plan:     Parainfluenza virus bronchitis - POA, detected by RVP. Supportive care. Steroids for support. COPD exacerbation - POA, viral trigger. Continue solumedrol, brovana, pulmicort, prn duonebs. Xray clear, no sepsis criteria, so no antibiotics     Acute hypoxemic respiratory failure - POA, presumed due to COPD and virus. Negative procalcitonin, DDimer, UA. Oxygen as needed, still on 2L     Confusion - Pressured and tangential speech. I suspect effect of steroids, or early dementia that would benefit from outpatient neuropsych testing. Dehydration / Hyponatremia - POA, mild. Hydrate and follow     Underweight - Supplements. Subjective:     Chief Complaint: feels better, still requiring 2L with activity    ROS:  (bold if positive, if negative)    SOB/DOETolerating PT  Tolerating Diet        Objective:     Last 24hrs VS reviewed since prior progress note.  Most recent are:    Visit Vitals  BP (!) 159/77   Pulse 88   Temp 98.5 °F (36.9 °C)   Resp 20   Ht 5' 2\" (1.575 m)   Wt 49.9 kg (110 lb)   SpO2 90%   BMI 20.12 kg/m²     SpO2 Readings from Last 6 Encounters:   22 90%    O2 Flow Rate (L/min): 3 l/min       Intake/Output Summary (Last 24 hours) at 2022 1241  Last data filed at 2022  Gross per 24 hour   Intake 100 ml   Output --   Net 100 ml        Physical Exam:    Gen:  Well-developed, well-nourished, in no acute distress  HEENT:  Pink conjunctivae, PERRL, hearing intact to voice, moist mucous membranes  Neck:  Supple, without masses, thyroid non-tender  Resp:  No accessory muscle use, bilateral breath sounds with wheezes   Card:  No murmurs, normal S1, S2 without thrills, bruits or peripheral edema  Abd:  Soft, non-tender, non-distended, normoactive bowel sounds are present, no mass  Lymph:  No cervical or inguinal adenopathy  Musc:  No cyanosis or clubbing  Skin:  No rashes or ulcers, skin turgor is good  Neuro:  Cranial nerves are grossly intact, no focal motor weakness, follows commands appropriately  Psych:  Good insight, oriented to person, place and time, alert    Telemetry reviewed:   normal sinus rhythm  __________________________________________________________________  Medications Reviewed: (see below)  Medications:     Current Facility-Administered Medications   Medication Dose Route Frequency    methylPREDNISolone (PF) (SOLU-MEDROL) injection 40 mg  40 mg IntraVENous Q12H    arformoteroL (BROVANA) neb solution 15 mcg  15 mcg Nebulization BID RT    budesonide (PULMICORT) 500 mcg/2 ml nebulizer suspension  500 mcg Nebulization BID RT    albuterol-ipratropium (DUO-NEB) 2.5 MG-0.5 MG/3 ML  3 mL Nebulization Q6H PRN    sodium chloride (NS) flush 5-40 mL  5-40 mL IntraVENous Q8H    sodium chloride (NS) flush 5-40 mL  5-40 mL IntraVENous PRN    acetaminophen (TYLENOL) tablet 650 mg  650 mg Oral Q6H PRN    polyethylene glycol (MIRALAX) packet 17 g  17 g Oral DAILY PRN    ondansetron (ZOFRAN) injection 4 mg  4 mg IntraVENous Q6H PRN    enoxaparin (LOVENOX) injection 30 mg  30 mg SubCUTAneous DAILY        Lab Data Reviewed: (see below)  Lab Review:     Recent Labs     06/21/22  0501 06/20/22  1309   WBC 6.7 8.9   HGB 15.0 16.1*   HCT 46.6 50.8*    217     Recent Labs     06/21/22  0501 06/20/22  1309   * 133*   K 4.6 4.3    95*   CO2 30 35*   * 115*   BUN 13 11   CREA 0.59 0.61   CA 9.0 9.6   MG 2.5*  --    ALB 2.8* 3.5   TBILI 0.3 0.4   ALT 27 33     No results found for: GLUCPOC  No results for input(s): PH, PCO2, PO2, HCO3, FIO2 in the last 72 hours. No results for input(s): INR, INREXT in the last 72 hours.   All Micro Results     Procedure Component Value Units Date/Time    CULTURE, URINE [[id number]] Collected: 06/20/22 1641    Order Status: Completed Specimen: Urine from Clean catch Updated: 06/20/22 2327    RESPIRATORY VIRUS PANEL W/COVID-19, PCR [[id number]]  (Abnormal) Collected: 06/20/22 1625    Order Status: Completed Specimen: Nasopharyngeal Updated: 06/20/22 2058     Adenovirus Not detected        Coronavirus 229E Not detected        Coronavirus HKU1 Not detected        Coronavirus CVNL63 Not detected        Coronavirus OC43 Not detected        SARS-CoV-2, PCR Not detected        Metapneumovirus Not detected        Rhinovirus and Enterovirus Not detected        Influenza A Not detected        Influenza A, subtype H1 Not detected        Influenza A, subtype H3 Not detected        INFLUENZA A H1N1 PCR Not detected        Influenza B Not detected        Parainfluenza 1 Not detected        Parainfluenza 2 Not detected        Parainfluenza 3 Detected        Parainfluenza virus 4 Not detected        RSV by PCR Not detected        B. parapertussis, PCR Not detected        Bordetella pertussis - PCR Not detected        Chlamydophila pneumoniae DNA, QL, PCR Not detected        Mycoplasma pneumoniae DNA, QL, PCR Not detected       COVID-19 RAPID TEST [[id number]]     Order Status: Sent           Other pertinent lab: none    Total time spent with patient: 30 Minutes I personally reviewed chart, notes, data and current medications in the medical record. I have personally examined and treated the patient at bedside during this period. To assist coordination of care and communication with nursing and staff, this note may be preliminary early in the day, but finalized by end of the day.                  Care Plan discussed with: Patient, Care Manager, Nursing Staff and >50% of time spent in counseling and coordination of care    Discussed:  Care Plan and D/C Planning    Prophylaxis:  Lovenox and H2B/PPI    Disposition:  Home w/Family           ___________________________________________________    Attending Physician: Mike Koo MD

## 2022-06-22 VITALS
WEIGHT: 110 LBS | OXYGEN SATURATION: 94 % | SYSTOLIC BLOOD PRESSURE: 152 MMHG | RESPIRATION RATE: 18 BRPM | BODY MASS INDEX: 20.24 KG/M2 | DIASTOLIC BLOOD PRESSURE: 76 MMHG | HEIGHT: 62 IN | HEART RATE: 65 BPM | TEMPERATURE: 98.5 F

## 2022-06-22 LAB
ATRIAL RATE: 66 BPM
CALCULATED P AXIS, ECG09: 60 DEGREES
CALCULATED R AXIS, ECG10: 69 DEGREES
CALCULATED T AXIS, ECG11: 71 DEGREES
DIAGNOSIS, 93000: NORMAL
P-R INTERVAL, ECG05: 136 MS
Q-T INTERVAL, ECG07: 380 MS
QRS DURATION, ECG06: 82 MS
QTC CALCULATION (BEZET), ECG08: 398 MS
VENTRICULAR RATE, ECG03: 66 BPM

## 2022-06-22 PROCEDURE — 94640 AIRWAY INHALATION TREATMENT: CPT

## 2022-06-22 PROCEDURE — 74011250636 HC RX REV CODE- 250/636: Performed by: INTERNAL MEDICINE

## 2022-06-22 PROCEDURE — 74011000250 HC RX REV CODE- 250: Performed by: INTERNAL MEDICINE

## 2022-06-22 PROCEDURE — 94618 PULMONARY STRESS TESTING: CPT

## 2022-06-22 RX ORDER — PREDNISONE 20 MG/1
TABLET ORAL
Qty: 7 TABLET | Refills: 0 | Status: SHIPPED | OUTPATIENT
Start: 2022-06-22 | End: 2022-06-27

## 2022-06-22 RX ORDER — AZITHROMYCIN 250 MG/1
500 TABLET, FILM COATED ORAL DAILY
Qty: 6 TABLET | Refills: 0 | Status: SHIPPED | OUTPATIENT
Start: 2022-06-22 | End: 2022-06-25

## 2022-06-22 RX ORDER — IBUPROFEN 200 MG
1 TABLET ORAL EVERY 24 HOURS
Qty: 30 PATCH | Refills: 0 | Status: SHIPPED | OUTPATIENT
Start: 2022-06-22 | End: 2022-07-22

## 2022-06-22 RX ADMIN — METHYLPREDNISOLONE SODIUM SUCCINATE 40 MG: 40 INJECTION, POWDER, FOR SOLUTION INTRAMUSCULAR; INTRAVENOUS at 09:58

## 2022-06-22 RX ADMIN — ARFORMOTEROL TARTRATE 15 MCG: 15 SOLUTION RESPIRATORY (INHALATION) at 08:49

## 2022-06-22 RX ADMIN — BUDESONIDE 500 MCG: 0.5 SUSPENSION RESPIRATORY (INHALATION) at 08:49

## 2022-06-22 RX ADMIN — SODIUM CHLORIDE, PRESERVATIVE FREE 10 ML: 5 INJECTION INTRAVENOUS at 09:58

## 2022-06-22 RX ADMIN — ENOXAPARIN SODIUM 30 MG: 100 INJECTION SUBCUTANEOUS at 09:58

## 2022-06-22 NOTE — PROGRESS NOTES
Bedside shift change report given to [facility]ET[facility] (oncoming nurse) by Lila Mallory (offgoing nurse). Report included the following information SBAR.

## 2022-06-22 NOTE — PROGRESS NOTES
6/22/2022  CARE MANAGEMENT NOTE:  CM reviewed EMR and handoff received from previous  Akil Cuba). Pt was admitted with parainfluenza viral bronchitis, COPD exac, acute respiratory failure. Reportedly, pt resides with her  Ricardo Booker ([phone])    RUR 7%    Transition Plan of Care:  1. Plan is for pt to return home  2. PT eval completed; pt ambulated 150 feet on 6/21 and no further skilled rehab services indicated. 3.  Home oxygen eval on 6/21 - pt was 88% on room air and 85% with activity  4. Outpt f/u  5.  will transport pt home    CM will continue to follow pt until discharged.   Maria D

## 2022-06-22 NOTE — DISCHARGE SUMMARY
Physician Discharge Summary     Patient ID:  Jaylin Martinez  [id number]  11 y.o.  1950    Admit date: 6/20/2022    Discharge date and time: 6/22/2022    Admission Diagnoses: [facility] exacerbation ([facility] [J44.1]    Discharge Diagnoses:    Principal Problem:    Parainfluenza virus bronchitis (6/21/2022)    Active Problems:    COPD exacerbation ([facility] (6/20/2022)      Acute hypoxemic respiratory failure ([facility] (6/20/2022)      Underweight (6/20/2022)      Dehydration (6/20/2022)      Hyponatremia (6/20/2022)           Hospital Course:   Ms. Cayetano Morgan is a 66 y.o.  female who presented to the Emergency Department complaining of dyspnea. Worsening over days. Not relieved with PO steroids. No fever or known sick contacts. ER finds wheezing and mild hypoxia. We will admit her for management. Parainfluenza virus bronchitis - POA, detected by RVP. Supportive care. Steroids for support     COPD exacerbation - POA, viral trigger.  Continue solumedrol, brovana, pulmicort, prn duonebs.   Says that smoking is her best friend and she is unable to quit. Dc on pred, katelyn. Counseled on importance of taking her LABA/LAMA/ICS, which she has but does not use regularly.      Acute hypoxemic respiratory failure - POA, presumed due to COPD and virus. Negative procalcitonin, DDimer, UA.  Oxygen as needed, still on 2L and discharges on this.      Confusion - Pressured and tangential speech. I suspect effect of steroids, or early dementia that would benefit from outpatient neuropsych testing.     Dehydration / Hyponatremia - POA, mild. Hydrated     Underweight - Supplements.     PCP: Yajaira Sands MD     Consults: None    Condition of patient at discharge: good and improved    Discharge Exam:    Physical Exam:    Gen: Well-developed, well-nourished, in no acute distress; chronically ill appearing  HEENT:  Pink conjunctivae, PERRL, hearing intact to voice, moist mucous membranes  Neck: Supple, without masses, thyroid non-tender  Resp: No accessory muscle use, clear breath sounds without wheezes rales or rhonchi  Card: No murmurs, normal S1, S2 without thrills, bruits or peripheral edema  Abd:  Soft, non-tender, non-distended, normoactive bowel sounds are present, no palpable organomegaly and no detectable hernias  Lymph:  No cervical or inguinal adenopathy  Musc: No cyanosis or clubbing  Skin: No rashes or ulcers, skin turgor is good  Neuro:  Cranial nerves are grossly intact, no focal motor weakness, follows commands appropriately  Psych:  Good insight, oriented to person, place and time, alert        Patient Instructions:   Current Discharge Medication List      START taking these medications    Details   predniSONE (DELTASONE) 20 mg tablet Take 2 Tablets by mouth daily for 2 days, THEN 1 Tablet daily for 3 days. Qty: 7 Tablet, Refills: 0      azithromycin (ZITHROMAX) 250 mg tablet Take 2 Tablets by mouth daily for 3 days. Qty: 6 Tablet, Refills: 0      nicotine (NICODERM CQ) 14 mg/24 hr patch 1 Patch by TransDERmal route every twenty-four (24) hours for 30 days. Qty: 30 Patch, Refills: 0           Activity: Activity as tolerated  Diet: Regular Diet  Wound Care: None needed    Follow-up with Sujit Watson MD in 1 week. Follow-up tests/labs None    Approximate time spent in patient care on day of discharge: 33 min    Signed:   Lizzie Ren MD  6/22/2022  11:47 AM

## 2022-06-22 NOTE — PROGRESS NOTES
D/C Order Noted:   4:00 PM- CM received the paperwork from [facility]- signed from  and re-faxed to them. CM spoke with Eriberto Oscar with [facility] [facility]- they have accepted and she is reaching out to the [facility]- [phone]) to deliver the tank. 1:58 PM- Pt remains on Med. Surg- stable for d/c. CM spoke with pt's  regarding arranging home O2-  states he will be up here around 2:00 PM. CM sent off to [facility] [facility] who has approved but has to deliver home O2 to pt's bedside. Unable to use [facility] as they do not go to pt's service area. Floor CM updated and will continue to follow and assist as needed. Care Management Interventions  PCP Verified by CM:  Yes  Palliative Care Criteria Met (RRAT>21 & CHF Dx)?: No  Mode of Transport at Discharge: Self  Transition of Care Consult (CM Consult): Discharge Planning  MyChart Signup: No  Discharge Durable Medical Equipment: Yes  Health Maintenance Reviewed: Yes  Physical Therapy Consult: Yes  Occupational Therapy Consult: Yes  Speech Therapy Consult: No  Support Systems: Spouse/Significant Other  Confirm Follow Up Transport: Family  The Plan for Transition of Care is Related to the Following Treatment Goals : dyspnea  The Patient and/or Patient Representative was Provided with a Choice of Provider and Agrees with the Discharge Plan?: Yes  Name of the Patient Representative Who was Provided with a Choice of Provider and Agrees with the Discharge Plan: Spoke with pt's   Freedom of Choice List was Provided with Basic Dialogue that Supports the Patient's Individualized Plan of Care/Goals, Treatment Preferences and Shares the Quality Data Associated with the Providers?: Yes  Discharge Location  Patient Expects to be Discharged to[de-identified] Home with family assistance    JOSE Padron, [address]

## 2022-06-22 NOTE — PROGRESS NOTES
MD De La Rosa notified that Tele informed RN that  HR was in 120s around 9am. Pt asymptomatic eating breakfast. RN gave morning cardiac meds. Other VSS. HR decreased to 100-110s. Mg 2g infusing now for mg 1.6. Tele monitor on, running afib/aflutter. Pt A0x3, resting in bed, asymptomatic.   Will continue to monitor HR.     ZAKI BALLESTEROS

## 2022-06-22 NOTE — PROGRESS NOTES
I have reviewed discharge instructions with the patient. The patient verbalized understanding. IV removed. Pt tolerated well. Questions answered. Tech wheeled pt off floor in wheelchair and home oxygen/NC 1 L on.  with belongings alongside.

## 2022-06-22 NOTE — PROGRESS NOTES
Hospital follow up not scheduled with PCP  Chichi Oconnor MD [facility].   Call center at lunch will be back after 1:00pm will try back at that time

## 2022-06-22 NOTE — DISCHARGE INSTRUCTIONS
HOSPITALIST DISCHARGE INSTRUCTIONS  NAME: Nahomi Rodriguez   :  1950   MRN:  [id number]     Date/Time:  2022 11:45 AM    ADMIT DATE: 2022     DISCHARGE DATE: 2022     ADMITTING DIAGNOSIS:  Acute Hypoxemic Respiratory Failure  COPD exacerbation  Parainfluenza virus infection    DISCHARGE DIAGNOSIS:  You were admitted for evaluation and treatment of the above. You will need to take 3 more days of antibiotics and 5 more days of steroids. You will discharge with Oxygen to be worn at all times. Please follow up with your PCP next week to see how you continue to recover and to determine if you still need oxygen    MEDICATIONS:    · It is important that you take the medication exactly as they are prescribed. · Keep your medication in the bottles provided by the pharmacist and keep a list of the medication names, dosages, and times to be taken in your wallet. · Do not take other medications without consulting your doctor. If you experience any of the following symptoms then please call your primary care physician or return to the emergency room if you cannot get hold of your doctor:  Fever, chills, nausea, vomiting, diarrhea, change in mentation, falling, bleeding, shortness of breath    Follow Up:  Dr. Naheed Madera MD  you are to call and set up an appointment to see them in 1 week. Information obtained by :  I understand that if any problems occur once I am at home I am to contact my physician. I understand and acknowledge receipt of the instructions indicated above.                                                                                                                                            Physician's or R.N.'s Signature                                                                  Date/Time                                                                                                                                              Patient or Representative Signature Date/Time

## 2022-06-23 ENCOUNTER — PATIENT OUTREACH (OUTPATIENT)
Dept: CASE MANAGEMENT | Age: 72
End: 2022-06-23

## 2022-06-23 NOTE — PROGRESS NOTES
Care Transitions Initial Call    Call within 2 business days of discharge: Yes. Patient: Kevin Dai Patient : 1950 MRN: [id number]    Last Discharge [address]       Complaint Diagnosis Description Type Department Provider    22 Shortness of Breath Acute exacerbation of chronic obstructive pulmonary disease (COPD) ([facility] [address] . .. ED to Hosp-Admission (Discharged) (ADMIT) Debbi Gee MD; Ria Mercado... Was this an external facility discharge? No.     Challenges to be reviewed by the provider   Additional needs identified to be addressed with provider:  no  None, patient has no red flags to report at this time. Method of communication with provider:  None, patient has no red flags to report at this time and patient's PCP, Dr. Shanta Griggs, is a non-[facility] provider. Component      Latest Ref Rng & Units 2022           4:25 PM   Parainfluenza 3      NOTD   Detected (A)     Component      Latest Ref Rng & Units 2022           1:09 PM   NT pro-BNP      0 - 125 PG/ (H)     Component      Latest Ref Rng & Units 2022           5:01 AM  1:09 PM   Sodium      136 - 145 mmol/L 135 (L) 133 (L)     Component      Latest Ref Rng & Units 2022           5:01 AM  1:09 PM   BUN/Creatinine ratio      12 -    22 (H) 18     Component      Latest Ref Rng & Units 2022           5:01 AM   Magnesium      1.6 - 2.4 mg/dL 2.5 (H)     Component      Latest Ref Rng & Units 2022           5:01 AM  1:09 PM   Albumin      3.5 - 5.0 g/dL 2.8 (L) 3.5     Component      Latest Ref Rng & Units 2022           4:41 PM   Blood      NEG   TRACE (A)     Component      Latest Ref Rng & Units 2022           4:41 PM   Amorphous Crystals      NEG   FEW (A)     Discussed COVID-19 related testing which was available at this time. Test results were negative on 22. Patient informed of results, if available? Yes.       Advance Care Planning:   Does patient have an Advance Directive: not on file; education provided. Inpatient Readmission Risk score: Unplanned Readmit Risk Score: 6.2 ( )    Was this a readmission? no   Patient stated reason for the admission: \"Shortness of breath. \"     Patients top risk factors for readmission: Medical condiiton - Recent COPD exacerbation, recent acute hypoxemic respiratory failure, parainfluenza virus per lab result on 2022, underweight, dehydration, and recent hyponatremia per chart. Interventions to address risk factors: Obtained and reviewed discharge summary and/or continuity of care documents and Education of patient/family/caregiver/guardian to support self-management-Education provided regarding signs/symptoms of shortness of breath and COPD exacerbation, patient verbalized an understanding. Care Transition Nurse (CTN) contacted the patient by telephone to perform post hospital discharge assessment. Verified name and  with patient as identifiers. Provided introduction to self, and explanation of the CTN role. CTN reviewed discharge instructions, medical action plan and red flags with patient who verbalized understanding. Were discharge instructions available to patient? yes. Reviewed appropriate site of care based on symptoms and resources available to patient including: PCP, Urgent Care Clinics and When to call 911. Patient given an opportunity to ask questions and does not have any further questions or concerns at this time. The patient agrees to contact the PCP office for questions related to their healthcare. Medication reconciliation was performed with patient, who verbalizes understanding of administration of home medications. Advised obtaining a 90-day supply of all daily and as-needed medications.    Referral to Pharm D needed: no.     Home Health/Outpatient orders at discharge: [address]: n/a  Date of initial visit: [address] [facility], [facility] within [facility] ordered at discharge: Home oxygen and oxygen supplies. [address]: [address]. Durable Medical Equipment received: Yes, per patient. Was patient discharged with a pulse oximeter? no.     Discussed follow-up appointment(s). If no appointment was previously scheduled, appointment scheduling offered: yes. Is follow up appointment scheduled within 7 days of discharge? Yes, patient has MONIQUE appointment scheduled with her PCP on 6-24-22. [facility] follow up appointment(s): No future appointments. [facility] follow up appointment(s): MONIQUE appointment scheduled with Dr. Cynthia Fisher/PCP on 6-24-22. Plan for follow-up call in 10-14 days based on severity of symptoms and risk factors. Plan for next call: self management-Review red flags of shortness of breath and COPD, and follow up appointment-Evaluate if patient is attending follow-up appointment(s) as scheduled, offer assistance with scheduling as needed. CTN provided contact information for future needs. Goals      Understands red flags post discharge. 6-23-22: Red flags of shortness of breath and COPD exacerbation reviewed with patient and patient verbalized an understanding. Patient denies chest pain, denies shortness of breath, denies fever/chills, and denies nausea/vomiting. States she is utilizing home oxygen at 2lpm continuously, provided by [address]. States she has smoked cigarettes for approximately 53 years, and states her last cigarette was on 6-. Utilizing a regular diet at home, appetite is good. Denies having any falls in the last twelve months. Patient has no red flags to report at this time. Care Transitions Nurse will review red flags again on next phone conversation with patient.  Daisy Campbell

## 2022-06-27 NOTE — PROGRESS NOTES
Physician Progress Note      Ranjith Daniels  CSN #:                  [id number]  :                       1950  ADMIT DATE:       2022 11:14 AM  DISCH DATE:        2022 6:07 PM  RESPONDING  PROVIDER #:        Rakesh Donahue MD          QUERY TEXT:    Good morning. Patient was admitted with COPD exacerbation and parainfluenza virus bronchitis, noted to have hyponatremia. If possible, please document in progress notes and discharge summary if you are evaluating and/or treating any of the following: The medical record reflects the following:  Risk Factors: COPD exacerbation, acute respiratory failure with hypoxia, dehydration  Clinical Indicators: Sodium 133 on admission; sodium 135 at discharge. Treatment: repeat labs; vital signs and weights per unit protocol    Thank you,  Anisa Pena RN, CDI  [phone]  Options provided:  -- Hyponatremia  -- Decreased sodium level not clinically significant  -- Other - I will add my own diagnosis  -- Disagree - Not applicable / Not valid  -- Disagree - Clinically unable to determine / Unknown  -- Refer to Clinical Documentation Reviewer    PROVIDER RESPONSE TEXT:    Decreased sodium level was not clinically significant.     Query created by: Manny Cadet on 2022 10:16 AM      Electronically signed by:  Rakesh Donahue MD 2022 3:29 PM

## 2022-07-20 PROBLEM — E86.0 DEHYDRATION: Status: RESOLVED | Noted: 2022-06-20 | Resolved: 2022-07-20

## 2024-09-13 ENCOUNTER — HOSPITAL ENCOUNTER (OUTPATIENT)
Facility: HOSPITAL | Age: 74
Discharge: HOME | End: 2024-09-16
Attending: INTERNAL MEDICINE
Payer: MEDICARE

## 2024-09-13 DIAGNOSIS — Z87.891 FORMER SMOKER: ICD-10-CM

## 2024-09-13 DIAGNOSIS — Z87.891 PERSONAL HISTORY OF TOBACCO USE: ICD-10-CM

## 2024-09-13 PROCEDURE — 71271 CT THORAX LUNG CANCER SCR C-: CPT

## 2025-02-14 ENCOUNTER — HOSPITAL ENCOUNTER (OUTPATIENT)
Facility: HOSPITAL | Age: 75
Discharge: HOME | End: 2025-02-17
Attending: INTERNAL MEDICINE
Payer: MEDICARE

## 2025-02-14 DIAGNOSIS — R91.1 PULMONARY NODULE: ICD-10-CM

## 2025-02-14 PROCEDURE — 71250 CT THORAX DX C-: CPT

## 2025-03-18 ENCOUNTER — TRANSCRIBE ORDERS (OUTPATIENT)
Facility: HOSPITAL | Age: 75
End: 2025-03-18

## 2025-03-18 DIAGNOSIS — R91.8 GROUND GLASS OPACITY PRESENT ON IMAGING OF LUNG: Primary | ICD-10-CM

## 2025-09-05 ENCOUNTER — HOSPITAL ENCOUNTER (OUTPATIENT)
Facility: HOSPITAL | Age: 75
Discharge: HOME | End: 2025-09-05
Attending: INTERNAL MEDICINE
Payer: MEDICARE

## 2025-09-05 DIAGNOSIS — R91.8 GROUND GLASS OPACITY PRESENT ON IMAGING OF LUNG: ICD-10-CM

## 2025-09-05 PROCEDURE — 71250 CT THORAX DX C-: CPT
